# Patient Record
Sex: MALE | Race: WHITE | URBAN - METROPOLITAN AREA
[De-identification: names, ages, dates, MRNs, and addresses within clinical notes are randomized per-mention and may not be internally consistent; named-entity substitution may affect disease eponyms.]

---

## 2017-05-31 ENCOUNTER — THERAPY VISIT (OUTPATIENT)
Dept: PHYSICAL THERAPY | Facility: CLINIC | Age: 47
End: 2017-05-31
Payer: OTHER MISCELLANEOUS

## 2017-05-31 DIAGNOSIS — M54.50 ACUTE RIGHT-SIDED LOW BACK PAIN WITHOUT SCIATICA: ICD-10-CM

## 2017-05-31 DIAGNOSIS — M53.3 SI (SACROILIAC) JOINT DYSFUNCTION: Primary | ICD-10-CM

## 2017-05-31 PROCEDURE — 97140 MANUAL THERAPY 1/> REGIONS: CPT | Mod: GP | Performed by: PHYSICAL THERAPIST

## 2017-05-31 PROCEDURE — 97161 PT EVAL LOW COMPLEX 20 MIN: CPT | Mod: GP | Performed by: PHYSICAL THERAPIST

## 2017-05-31 NOTE — PROGRESS NOTES
[facility] Initial Evaluation      Subjective:    Patient is a 47 year old male presenting with rehab back hpi. The history is provided by the patient. No  was used.   Walt Bradshaw is a 47 year old male with a lumbar condition.  Condition occurred with:  Bending.  Condition occurred: at work.  This is a new condition  Pt presents with primary complaint of low back pain, pt states he feels it is muscular. Pt states he was at work and bent forward to lift an object, was unable to  the object due to the sudden onset of pain. Pain is on the R side in the low lumbar and SI joint. Injury ocurred on 4-25-17. Pt followed up with physician on 5-15-17 and was referred to PT. He denies LE pain, numbness and tinging. Pain is aggravated with leaning over, bending, getting up from sitting. Relieved with rest in bed in the right position only, otherwise constant pain. .    Patient reports pain:  Lower lumbar spine and SI joint right.  Radiates to:  Gluteals right.  Pain is described as aching and is constant and reported as 5/10 and 8/10.  Associated symptoms:  Loss of motion/stiffness and loss of strength. Pain is the same all the time (depends on activity, slightly worse in the am's).  Symptoms are exacerbated by bending and lifting and relieved by rest.  Since onset symptoms are gradually improving and unchanged.        General health as reported by patient is good.  Pertinent medical history includes:  None.  Medical allergies: no.  Other surgeries include:  No.  Current medications:  Muscle relaxants.  Current occupation is  .  Patient is working in normal job without restrictions.  Primary job tasks include:  Lifting and repetitive tasks.    Barriers include:  None as reported by the patient.    Red flags:  None as reported by the patient.                        Objective:    System         Lumbar/SI Evaluation  ROM:    AROM Lumbar:   Flexion:          Mid thigh, pain R  low lumbar, SI  Ext:                    15 deg, no pain    Side Bend:        Left:  25 no pain    Right:  20 deg R low back pain   Rotation:           Left:     Right:   Side Glide:        Left:     Right:           Lumbar Myotomes:  normal            Lumbar DTR's:  not assessed        Lumbar Dermtomes:  normal                Neural Tension/Mobility:  Lumbar:  Normal        Lumbar Palpation:  Palpation (lumbar): R SI joint space proximal     Tenderness not present at Left:    Quadratus Lumborum; Erector Spinae; Piriformis; PSIS; ASIS; Iliac Crest; Gluteus Medius; Greater Trochanter; Ischial Tuberosity; Hamstrings; Hip Flexors or Vertebral  Tenderness present at Right: Erector Spinae and PSIS  Tenderness not present at Right:  Quadratus Lumborum; Piriformis; ASIS; Iliac Crest; Gluteus Medius; Greater Trochanter; Hamstrings; Hip flexors or Vertebral      Spinal Segmental Conclusions:     Level: Hypo noted at L1, L2, L3, L4 and L5  Level:  FRS left noted at L3 and L4    SI joint/Sacrum:    Low L iliac crest, low L PSIS, (+) R standing flexion test, (+) L giletts test. High L ASIS. (+) sacral flexion test for L on R torsion, (+) shear test R         Sacral conclusion left:  Sacral torsion                                               General     ROS    Assessment/Plan:      Patient is a 47 year old male with lumbar and sacral complaints.    Patient has the following significant findings with corresponding treatment plan.                Diagnosis 1:  Low back pain, SI joint dysfunction   Pain -  hot/cold therapy, US, electric stimulation, manual therapy, self management, education, directional preference exercise and home program  Decreased ROM/flexibility - manual therapy, therapeutic exercise and home program  Decreased joint mobility - manual therapy, therapeutic exercise and home program  Inflammation - cold therapy, US, electric stimulation and self management/home program  Impaired muscle performance - neuro  re-education and home program  Decreased function - therapeutic activities and home program  Impaired posture - neuro re-education and home program    Therapy Evaluation Codes:   1) History comprised of:   Personal factors that impact the plan of care:      Profession.    Comorbidity factors that impact the plan of care are:      None.     Medications impacting care: None.  2) Examination of Body Systems comprised of:   Body structures and functions that impact the plan of care:      Lumbar spine and Sacral illiac joint.   Activity limitations that impact the plan of care are:      Bending and Lifting.  3) Clinical presentation characteristics are:   Stable/Uncomplicated.  4) Decision-Making    Low complexity using standardized patient assessment instrument and/or measureable assessment of functional outcome.  Cumulative Therapy Evaluation is: Low complexity.    Previous and current functional limitations:  (See Goal Flow Sheet for this information)    Short term and Long term goals: (See Goal Flow Sheet for this information)     Communication ability:  Patient appears to be able to clearly communicate and understand verbal and written communication and follow directions correctly.  Treatment Explanation - The following has been discussed with the patient:   RX ordered/plan of care  Anticipated outcomes  Possible risks and side effects  This patient would benefit from PT intervention to resume normal activities.   Rehab potential is good.    Frequency:  2 X week, once daily  Duration:  for 2 weeks tapering to 1 X a week over 6 weeks  Discharge Plan:  Achieve all LTG.  Independent in home treatment program.  Reach maximal therapeutic benefit.    Please refer to the daily flowsheet for treatment today, total treatment time and time spent performing 1:1 timed codes.

## 2017-05-31 NOTE — MR AVS SNAPSHOT
After Visit Summary   5/31/2017    Walt Bradshaw    MRN: [id number]           Patient Information     Date Of Birth          1970        Visit Information        Provider Department      5/31/2017 5:50 PM Colt Bello, PT [facility]        Today's Diagnoses     SI (sacroiliac) joint dysfunction    -  1    Acute right-sided low back pain without sciatica           Follow-ups after your visit        Your next 10 appointments already scheduled     Jun 05, 2017  5:50 PM CDT   ESTHER Spine with Colt Bello PT   [facility] ([facility] [employer] [place]  )    [address]  [place] [address]   [phone]            Jun 14, 2017  4:30 PM CDT   ESTHER Spine with Colt Bello PT   [facility] ([facility] [employer] [place]  )    [address]  [place] [id number] [phone]              Who to contact     If you have questions or need follow up information about today's clinic visit or your schedule please contact [facility] directly at [phone].  Normal or non-critical lab and imaging results will be communicated to you by MyChart, letter or phone within 4 business days after the clinic has received the results. If you do not hear from us within 7 days, please contact the clinic through [employer] or phone. If you have a critical or abnormal lab result, we will notify you by phone as soon as possible.  Submit refill requests through [employer] or call your pharmacy and they will forward the refill request to us. Please allow 3 business days for your refill to be completed.          Additional Information About Your Visit        [employer] Information     [employer] lets you send messages to your doctor, view your test results, renew your prescriptions, schedule appointments and more. To sign up, go to  [email or web address] . Click on \"Log in\" on the left side of the screen, which will take you to the Welcome page. Then click on \"Sign up Now\" on the right side of the page.     You will be asked to enter the access code listed below, as well as some personal information. Please follow the directions to create your username and password.     Your access code is: [email or web address]  Expires: 2017  6:35 PM     Your access code will  in 90 days. If you need help or a new code, please call your [facility] clinic or [phone].        Care EveryWhere ID     This is your Care EveryWhere ID. This could be used by other organizations to access your [facility] medical records  [id number]         Blood Pressure from Last 3 Encounters:   No data found for BP    Weight from Last 3 Encounters:   No data found for Wt              We Performed the Following     HC PT EVAL, LOW COMPLEXITY     ESTHER INITIAL EVAL REPORT     MANUAL THER TECH,1+REGIONS,EA 15 MIN        Primary Care Provider    None Specified       No primary provider on file.        Thank you!     Thank you for choosing [facility]  for your care. Our goal is always to provide you with excellent care. Hearing back from our patients is one way we can continue to improve our services. Please take a few minutes to complete the written survey that you may receive in the mail after your visit with us. Thank you!             Your Updated Medication List - Protect others around you: Learn how to safely use, store and throw away your medicines at www.disposemymeds.org.      Notice  As of 2017  6:35 PM    You have not been prescribed any medications.      "

## 2017-05-31 NOTE — LETTER
[facility]  [address]  [facility] [id number] [phone]    2017    Re: Walt Bradshaw   :   1970  MRN:  [id number]   REFERRING PHYSICIAN:   Darya RAMOS    [facility]  Date of Initial Evaluation:  17  Visits:  Rxs Used: 1  Reason for Referral:     SI (sacroiliac) joint dysfunction  Acute right-sided low back pain without sciatica    EVALUATION SUMMARY    [facility] Initial Evaluation      Subjective:    Patient is a 47 year old male presenting with rehab back hpi. The history is provided by the patient. No  was used.   Walt Bradshaw is a 47 year old male with a lumbar condition.  Condition occurred with:  Bending.  Condition occurred: at work.  This is a new condition  Pt presents with primary complaint of low back pain, pt states he feels it is muscular. Pt states he was at work and bent forward to lift an object, was unable to  the object due to the sudden onset of pain. Pain is on the R side in the low lumbar and SI joint. Injury ocurred on 17. Pt followed up with physician on 5-15-17 and was referred to PT. He denies LE pain, numbness and tinging. Pain is aggravated with leaning over, bending, getting up from sitting. Relieved with rest in bed in the right position only, otherwise constant pain. .    Patient reports pain:  Lower lumbar spine and SI joint right.  Radiates to:  Gluteals right.  Pain is described as aching and is constant and reported as 5/10 and 8/10.  Associated symptoms:  Loss of motion/stiffness and loss of strength. Pain is the same all the time (depends on activity, slightly worse in the am's).  Symptoms are exacerbated by bending and lifting and relieved by rest.  Since onset symptoms are gradually improving and unchanged.        General health as reported by patient is good.  Pertinent medical history  includes:  None.  Medical allergies: no.  Other surgeries include:  No.  Current medications:  Muscle relaxants.  Current occupation is  .  Patient is working in normal job without restrictions.  Primary job tasks include:  Lifting and repetitive tasks.    Barriers include:  None as reported by the patient.    Red flags:  None as reported by the patient.      Objective:    Lumbar/SI Evaluation  ROM:    AROM Lumbar:   Flexion:          Mid thigh, pain R low lumbar, SI  Ext:                    15 deg, no pain    Side Bend:        Left:  25 no pain    Right:  20 deg R low back pain   Rotation:           Left:     Right:   Side Glide:        Left:     Right:      Lumbar Myotomes:  normal    Lumbar DTR's:  not assessed    Lumbar Dermtomes:  normal    Neural Tension/Mobility:  Lumbar:  Normal      Lumbar Palpation:  Palpation (lumbar): R SI joint space proximal   Tenderness not present at Left:    Quadratus Lumborum; Erector Spinae; Piriformis; PSIS; ASIS; Iliac Crest; Gluteus Medius; Greater Trochanter; Ischial Tuberosity; Hamstrings; Hip Flexors or Vertebral  Tenderness present at Right: Erector Spinae and PSIS  Tenderness not present at Right:  Quadratus Lumborum; Piriformis; ASIS; Iliac Crest; Gluteus Medius; Greater Trochanter; Hamstrings; Hip flexors or Vertebral    Spinal Segmental Conclusions:   Level: Hypo noted at L1, L2, L3, L4 and L5  Level:  FRS left noted at L3 and L4    SI joint/Sacrum:    Low L iliac crest, low L PSIS, (+) R standing flexion test, (+) L giletts test. High L ASIS. (+) sacral flexion test for L on R torsion, (+) shear test R   Sacral conclusion left:  Sacral torsion     Assessment/Plan:      Patient is a 47 year old male with lumbar and sacral complaints.    Patient has the following significant findings with corresponding treatment plan.                Diagnosis 1:  Low back pain, SI joint dysfunction   Pain -  hot/cold therapy, US, electric stimulation, manual therapy, self  management, education, directional preference exercise and home program  Decreased ROM/flexibility - manual therapy, therapeutic exercise and home program  Decreased joint mobility - manual therapy, therapeutic exercise and home program  Inflammation - cold therapy, US, electric stimulation and self management/home program  Impaired muscle performance - neuro re-education and home program  Decreased function - therapeutic activities and home program  Impaired posture - neuro re-education and home program      Therapy Evaluation Codes:   1) History comprised of:   Personal factors that impact the plan of care:      Profession.    Comorbidity factors that impact the plan of care are:      None.     Medications impacting care: None.  2) Examination of Body Systems comprised of:   Body structures and functions that impact the plan of care:      Lumbar spine and Sacral illiac joint.   Activity limitations that impact the plan of care are:      Bending and Lifting.  3) Clinical presentation characteristics are:   Stable/Uncomplicated.  4) Decision-Making    Low complexity using standardized patient assessment instrument and/or measureable assessment of functional outcome.  Cumulative Therapy Evaluation is: Low complexity.    Previous and current functional limitations:  (See Goal Flow Sheet for this information)    Short term and Long term goals: (See Goal Flow Sheet for this information)     Communication ability:  Patient appears to be able to clearly communicate and understand verbal and written communication and follow directions correctly.  Treatment Explanation - The following has been discussed with the patient:   RX ordered/plan of care  Anticipated outcomes  Possible risks and side effects  This patient would benefit from PT intervention to resume normal activities.   Rehab potential is good.    Frequency:  2 X week, once daily  Duration:  for 2 weeks tapering to 1 X a week over 6 weeks  Discharge Plan:  Achieve  all LTG.  Independent in home treatment program.  Reach maximal therapeutic benefit.      Thank you for your referral.    INQUIRIES  Therapist: Colt Bello [facility] [place] [facility]  [address] [place] [address] [address]  [facility] [address]  Phone: [phone]  Fax: [phone]

## 2017-06-05 ENCOUNTER — THERAPY VISIT (OUTPATIENT)
Dept: PHYSICAL THERAPY | Facility: CLINIC | Age: 47
End: 2017-06-05
Payer: OTHER MISCELLANEOUS

## 2017-06-05 DIAGNOSIS — M53.3 SI (SACROILIAC) JOINT DYSFUNCTION: ICD-10-CM

## 2017-06-05 DIAGNOSIS — M54.50 ACUTE RIGHT-SIDED LOW BACK PAIN WITHOUT SCIATICA: ICD-10-CM

## 2017-06-05 PROCEDURE — 97110 THERAPEUTIC EXERCISES: CPT | Mod: GP | Performed by: PHYSICAL THERAPIST

## 2017-06-05 PROCEDURE — 97112 NEUROMUSCULAR REEDUCATION: CPT | Mod: GP | Performed by: PHYSICAL THERAPIST

## 2017-06-14 ENCOUNTER — THERAPY VISIT (OUTPATIENT)
Dept: PHYSICAL THERAPY | Facility: CLINIC | Age: 47
End: 2017-06-14
Payer: OTHER MISCELLANEOUS

## 2017-06-14 DIAGNOSIS — M53.3 SI (SACROILIAC) JOINT DYSFUNCTION: ICD-10-CM

## 2017-06-14 DIAGNOSIS — M54.50 ACUTE RIGHT-SIDED LOW BACK PAIN WITHOUT SCIATICA: ICD-10-CM

## 2017-06-14 PROCEDURE — 97140 MANUAL THERAPY 1/> REGIONS: CPT | Mod: GP | Performed by: PHYSICAL THERAPIST

## 2017-06-14 PROCEDURE — 97110 THERAPEUTIC EXERCISES: CPT | Mod: GP | Performed by: PHYSICAL THERAPIST

## 2017-06-28 ENCOUNTER — THERAPY VISIT (OUTPATIENT)
Dept: PHYSICAL THERAPY | Facility: CLINIC | Age: 47
End: 2017-06-28
Payer: OTHER MISCELLANEOUS

## 2017-06-28 DIAGNOSIS — M54.50 ACUTE RIGHT-SIDED LOW BACK PAIN WITHOUT SCIATICA: ICD-10-CM

## 2017-06-28 DIAGNOSIS — M53.3 SI (SACROILIAC) JOINT DYSFUNCTION: ICD-10-CM

## 2017-06-28 PROCEDURE — 97110 THERAPEUTIC EXERCISES: CPT | Mod: GP | Performed by: PHYSICAL THERAPIST

## 2017-06-28 PROCEDURE — 97140 MANUAL THERAPY 1/> REGIONS: CPT | Mod: GP | Performed by: PHYSICAL THERAPIST

## 2017-07-12 ENCOUNTER — THERAPY VISIT (OUTPATIENT)
Dept: PHYSICAL THERAPY | Facility: CLINIC | Age: 47
End: 2017-07-12
Payer: OTHER MISCELLANEOUS

## 2017-07-12 DIAGNOSIS — M53.3 SI (SACROILIAC) JOINT DYSFUNCTION: ICD-10-CM

## 2017-07-12 DIAGNOSIS — M54.50 ACUTE RIGHT-SIDED LOW BACK PAIN WITHOUT SCIATICA: ICD-10-CM

## 2017-07-12 PROCEDURE — 97110 THERAPEUTIC EXERCISES: CPT | Mod: GP | Performed by: PHYSICAL THERAPIST

## 2017-07-12 NOTE — LETTER
[facility]  [address]  [facility] [id number] [phone]    2017    Re: Walt Bradshaw   :   1970  MRN:  [id number]   REFERRING PHYSICIAN:   Darya RAMOS    [facility]  Date of Initial Evaluation:  17  Visits:  Rxs Used: 5  Reason for Referral:     SI (sacroiliac) joint dysfunction  Acute right-sided low back pain without sciatica    Oswestry Score: 0 %                 DISCHARGE REPORT  Progress reporting period is from 17 to 17.       SUBJECTIVE  Subjective: Pt reports he has been feeling well, no pain or issues since the last visit. No low back pain, no leg pain    Current pain level is 0/10 Current Pain level: 0/10.     Previous pain level was  2/10 Initial Pain level: 5/10.   Changes in function:  Yes (See Goal flowsheet attached for changes in current functional level)  Adverse reaction to treatment or activity: None    OBJECTIVE  Changes noted in objective findings:    Objective: Pt with level bony landmarks, symmetrical motion in the SI joint. Non tender to palpation. Trunk AROM full, pain free. Pt performing normal job the past 2 weeks pain free. Pt has met LTG, will discharge PT. Reviewed HEP, to eloisaiue, discharge PT.      ASSESSMENT/PLAN  Updated problem list and treatment plan: Diagnosis 1:  Low back pain, SI joint dysfunction   Resolved   STG/LTGs have been met or progress has been made towards goals:  Yes (See Goal flow sheet completed today.)  Assessment of Progress: The patient has met all of their long term goals.  Self Management Plans:  Patient has been instructed in a home treatment program.  Patient is independent in a home treatment program.  Patient  has been instructed in self management of symptoms.  Patient is independent in self management of symptoms.    Walt continues to require the following intervention to meet STG and LTG's:   Chiropractic intervention is no longer required to meet STG/LTG.      Recommendations:  This patient is ready to be discharged from therapy and continue their home treatment program.        Thank you for your referral.    INQUIRIES  Therapist: Cotl Bello [facility] [place] [facility]  [address]  [facility] [address]  Phone: [phone]  Fax: [phone]

## 2017-07-12 NOTE — PROGRESS NOTES
Subjective:    HPI  Oswestry Score: 0 %                 Objective:    System    Physical Exam    General     ROS    Assessment/Plan:      DISCHARGE REPORT    Progress reporting period is from 5-31-17 to 7-12-17.       SUBJECTIVE  Subjective: Pt reports he has been feeling well, no pain or issues since the last visit. No low back pain, no leg pain    Current pain level is 0/10 Current Pain level: 0/10.     Previous pain level was  2/10 Initial Pain level: 5/10.   Changes in function:  Yes (See Goal flowsheet attached for changes in current functional level)  Adverse reaction to treatment or activity: None    OBJECTIVE  Changes noted in objective findings:    Objective: Pt with level bony landmarks, symmetrical motion in the SI joint. Non tender to palpation. Trunk AROM full, pain free. Pt performing normal job the past 2 weeks pain free. Pt has met LTG, will discharge PT. Reviewed HEP, to abigail, discharge PT.      ASSESSMENT/PLAN  Updated problem list and treatment plan: Diagnosis 1:  Low back pain, SI joint dysfunction   Resolved   STG/LTGs have been met or progress has been made towards goals:  Yes (See Goal flow sheet completed today.)  Assessment of Progress: The patient has met all of their long term goals.  Self Management Plans:  Patient has been instructed in a home treatment program.  Patient is independent in a home treatment program.  Patient  has been instructed in self management of symptoms.  Patient is independent in self management of symptoms.    Walt continues to require the following intervention to meet STG and LTG's:  Chiropractic intervention is no longer required to meet STG/LTG.    Recommendations:  This patient is ready to be discharged from therapy and continue their home treatment program.    Please refer to the daily flowsheet for treatment today, total treatment time and time spent performing 1:1 timed codes.

## 2017-07-12 NOTE — MR AVS SNAPSHOT
After Visit Summary   7/12/2017    Walt Bradshaw    MRN: [id number]           Patient Information     Date Of Birth          1970        Visit Information        Provider Department      7/12/2017 5:50 PM Colt Bello, PT [facility]        Today's Diagnoses     SI (sacroiliac) joint dysfunction        Acute right-sided low back pain without sciatica           Follow-ups after your visit        Your next 10 appointments already scheduled     Jul 17, 2017  5:10 PM CDT   [facility] with Colt Bello PT   [facility] ([facility]  )    [address]  [place] [place] [address]   [phone]            Jul 24, 2017  5:10 PM CDT   [facility] with Colt Bello PT   [facility] ([facility]  )    [address]  [place] [place] [address]   [phone]            Jul 31, 2017  5:10 PM CDT   [facility] with Colt Bello PT   [facility] ([facility]  )    [address]  [facility] [place] [address]   [phone]              Who to contact     If you have questions or need follow up information about today's clinic visit or your schedule please contact [facility]ERAP[facility] directly at [phone].  Normal or non-critical lab and imaging results will be communicated to you by MyChart, letter or phone within 4 business days after the clinic has received the results. If you do not hear from us within 7 days, please contact the clinic through [employer] or phone. If you have a critical or abnormal lab result, we will notify you by phone as soon as possible.  Submit refill requests through [employer] or call your pharmacy and they will forward the refill request to us. Please allow 3 business days for your refill to be completed.          Additional  "Information About Your Visit        MyChart Information     [employer]o lets you send messages to your doctor, view your test results, renew your prescriptions, schedule appointments and more. To sign up, go to [email or web address] . Click on \"Log in\" on the left side of the screen, which will take you to the Welcome page. Then click on \"Sign up Now\" on the right side of the page.     You will be asked to enter the access code listed below, as well as some personal information. Please follow the directions to create your username and password.     Your access code is: [email or web address]  Expires: 2017  6:35 PM     Your access code will  in 90 days. If you need help or a new code, please call your [place] clinic or [phone].        Care EveryWhere ID     This is your Care EveryWhere ID. This could be used by other organizations to access your [place] medical records  [id number]         Blood Pressure from Last 3 Encounters:   No data found for BP    Weight from Last 3 Encounters:   No data found for Wt              We Performed the Following     [facility] PROGRESS NOTES REPORT     THERAPEUTIC EXERCISES        Primary Care Provider    None Specified       No primary provider on file.        Equal Access to Services     [facility]: Hadii genesis Hill, wapipoda patti, elin becerraaljuana stoner, echo mack . So [facility] [phone].    ATENCIÓN: Si habla español, tiene a adams disposición servicios gratuitos de asistencia lingüística. Wallace al [phone].    We comply with applicable federal civil rights laws and [place] laws. We do not discriminate on the basis of race, color, national origin, age, disability sex, sexual orientation or gender identity.            Thank you!     Thank you for choosing [facility]ER[facility]  for your care. Our goal is always to provide you with excellent care. Hearing back from our patients is one way " we can continue to improve our services. Please take a few minutes to complete the written survey that you may receive in the mail after your visit with us. Thank you!             Your Updated Medication List - Protect others around you: Learn how to safely use, store and throw away your medicines at www.disposemymeds.org.      Notice  As of 7/12/2017  6:17 PM    You have not been prescribed any medications.